# Patient Record
Sex: MALE | Race: WHITE | Employment: FULL TIME | URBAN - METROPOLITAN AREA
[De-identification: names, ages, dates, MRNs, and addresses within clinical notes are randomized per-mention and may not be internally consistent; named-entity substitution may affect disease eponyms.]

---

## 2017-05-13 ENCOUNTER — EMPLOYEE WELLNESS (OUTPATIENT)
Dept: OTHER | Age: 42
End: 2017-05-13

## 2017-05-13 LAB
CHOLESTEROL, TOTAL: 140 MG/DL (ref 0–199)
GLUCOSE BLD-MCNC: 101 MG/DL (ref 70–99)
HDLC SERPL-MCNC: 58 MG/DL (ref 40–60)
LDL CHOLESTEROL CALCULATED: 74 MG/DL
TRIGL SERPL-MCNC: 39 MG/DL (ref 0–150)

## 2017-12-27 ENCOUNTER — OFFICE VISIT (OUTPATIENT)
Dept: ORTHOPEDIC SURGERY | Age: 42
End: 2017-12-27

## 2017-12-27 VITALS
WEIGHT: 145.06 LBS | DIASTOLIC BLOOD PRESSURE: 76 MMHG | HEIGHT: 72 IN | SYSTOLIC BLOOD PRESSURE: 111 MMHG | HEART RATE: 71 BPM | BODY MASS INDEX: 19.65 KG/M2

## 2017-12-27 DIAGNOSIS — M54.50 LUMBAR SPINE PAIN: Primary | ICD-10-CM

## 2017-12-27 DIAGNOSIS — S39.012A STRAIN OF LUMBAR REGION, INITIAL ENCOUNTER: ICD-10-CM

## 2017-12-27 PROCEDURE — 72100 X-RAY EXAM L-S SPINE 2/3 VWS: CPT | Performed by: PHYSICIAN ASSISTANT

## 2017-12-27 PROCEDURE — 99202 OFFICE O/P NEW SF 15 MIN: CPT | Performed by: PHYSICIAN ASSISTANT

## 2017-12-27 RX ORDER — CYCLOBENZAPRINE HCL 10 MG
10 TABLET ORAL 3 TIMES DAILY PRN
Qty: 20 TABLET | Refills: 0 | Status: SHIPPED | OUTPATIENT
Start: 2017-12-27 | End: 2018-01-06

## 2017-12-27 RX ORDER — METHYLPREDNISOLONE 4 MG/1
TABLET ORAL
Qty: 1 KIT | Refills: 0 | Status: SHIPPED | OUTPATIENT
Start: 2017-12-27 | End: 2018-01-02

## 2017-12-27 NOTE — PROGRESS NOTES
low back    Plan: rest the injured area as much as practical, apply ice packs, prescription for muscle relaxant, referral to Physical Therapy  We discussed and reviewed his x rays today. We discussed his low back strain as well as his previous back injuries  We discussed recommendation at this time for medrol dose pack and flexeril 10mg TID PRN #20. We discussed recommendation for PT to initiate lumbar stabilization and core exercises. Pt is in agreement and was given a PT script today  He will continue to avoid painful exercises  He will follow up with Dr. Sanjay Weiss. Pt also noted left knee pain for some time after a tough mudder, we have referred him to Dr. Ja Hadley or Ekaterina Nugent for this issue    Mana Sewell PA-C        No orders of the defined types were placed in this encounter.

## 2017-12-28 NOTE — PATIENT INSTRUCTIONS
Follow up with Dr. Nicolas Bowers for your back  Follow up with Dr. Ana Smith or Romeo Francois for your knee

## 2018-03-02 ENCOUNTER — EMPLOYEE WELLNESS (OUTPATIENT)
Dept: OTHER | Age: 43
End: 2018-03-02

## 2018-03-02 LAB
CHOLESTEROL, TOTAL: 135 MG/DL (ref 0–199)
GLUCOSE BLD-MCNC: 89 MG/DL (ref 70–99)
HDLC SERPL-MCNC: 61 MG/DL (ref 40–60)
LDL CHOLESTEROL CALCULATED: 67 MG/DL
TRIGL SERPL-MCNC: 33 MG/DL (ref 0–150)

## 2018-03-20 VITALS — WEIGHT: 151 LBS | BODY MASS INDEX: 20.48 KG/M2

## 2018-04-02 VITALS — BODY MASS INDEX: 20.61 KG/M2 | WEIGHT: 152 LBS

## 2018-12-27 ENCOUNTER — HOSPITAL ENCOUNTER (EMERGENCY)
Age: 43
Discharge: HOME | End: 2018-12-27
Attending: EMERGENCY MEDICINE
Payer: COMMERCIAL

## 2018-12-27 VITALS
WEIGHT: 145 LBS | RESPIRATION RATE: 20 BRPM | HEART RATE: 88 BPM | TEMPERATURE: 98.5 F | HEIGHT: 72 IN | OXYGEN SATURATION: 100 % | SYSTOLIC BLOOD PRESSURE: 122 MMHG | BODY MASS INDEX: 19.64 KG/M2 | DIASTOLIC BLOOD PRESSURE: 74 MMHG

## 2018-12-27 DIAGNOSIS — N20.0 KIDNEY STONE: Primary | ICD-10-CM

## 2018-12-27 LAB
A/G RATIO: 1.8 (ref 1.1–2.2)
ALBUMIN SERPL-MCNC: 4.7 G/DL (ref 3.4–5)
ALP BLD-CCNC: 34 U/L (ref 40–129)
ALT SERPL-CCNC: 10 U/L (ref 10–40)
ANION GAP SERPL CALCULATED.3IONS-SCNC: 10 MMOL/L (ref 3–16)
AST SERPL-CCNC: 19 U/L (ref 15–37)
BASOPHILS ABSOLUTE: 0 K/UL (ref 0–0.2)
BASOPHILS RELATIVE PERCENT: 0.7 %
BILIRUB SERPL-MCNC: 0.6 MG/DL (ref 0–1)
BILIRUBIN URINE: NEGATIVE
BLOOD, URINE: ABNORMAL
BUN BLDV-MCNC: 17 MG/DL (ref 7–20)
CALCIUM SERPL-MCNC: 9.7 MG/DL (ref 8.3–10.6)
CHLORIDE BLD-SCNC: 102 MMOL/L (ref 99–110)
CLARITY: CLEAR
CO2: 29 MMOL/L (ref 21–32)
COLOR: ABNORMAL
CREAT SERPL-MCNC: 0.9 MG/DL (ref 0.9–1.3)
EOSINOPHILS ABSOLUTE: 0 K/UL (ref 0–0.6)
EOSINOPHILS RELATIVE PERCENT: 0.6 %
GFR AFRICAN AMERICAN: >60
GFR NON-AFRICAN AMERICAN: >60
GLOBULIN: 2.6 G/DL
GLUCOSE BLD-MCNC: 105 MG/DL (ref 70–99)
GLUCOSE URINE: NEGATIVE MG/DL
HCT VFR BLD CALC: 43.3 % (ref 40.5–52.5)
HEMOGLOBIN: 14.7 G/DL (ref 13.5–17.5)
KETONES, URINE: ABNORMAL MG/DL
LEUKOCYTE ESTERASE, URINE: NEGATIVE
LYMPHOCYTES ABSOLUTE: 1.2 K/UL (ref 1–5.1)
LYMPHOCYTES RELATIVE PERCENT: 22.6 %
MCH RBC QN AUTO: 29.9 PG (ref 26–34)
MCHC RBC AUTO-ENTMCNC: 33.9 G/DL (ref 31–36)
MCV RBC AUTO: 88.1 FL (ref 80–100)
MICROSCOPIC EXAMINATION: YES
MONOCYTES ABSOLUTE: 0.4 K/UL (ref 0–1.3)
MONOCYTES RELATIVE PERCENT: 8.3 %
MUCUS: ABNORMAL /LPF
NEUTROPHILS ABSOLUTE: 3.5 K/UL (ref 1.7–7.7)
NEUTROPHILS RELATIVE PERCENT: 67.8 %
NITRITE, URINE: NEGATIVE
OTHER OBSERVATIONS UA: ABNORMAL
PDW BLD-RTO: 13.2 % (ref 12.4–15.4)
PH UA: 6
PLATELET # BLD: 184 K/UL (ref 135–450)
PMV BLD AUTO: 8 FL (ref 5–10.5)
POTASSIUM REFLEX MAGNESIUM: 4 MMOL/L (ref 3.5–5.1)
PROTEIN UA: ABNORMAL MG/DL
RBC # BLD: 4.91 M/UL (ref 4.2–5.9)
RBC UA: ABNORMAL /HPF (ref 0–2)
SODIUM BLD-SCNC: 141 MMOL/L (ref 136–145)
SPECIFIC GRAVITY UA: 1.02
TOTAL PROTEIN: 7.3 G/DL (ref 6.4–8.2)
URINE REFLEX TO CULTURE: ABNORMAL
URINE TYPE: ABNORMAL
UROBILINOGEN, URINE: 0.2 E.U./DL
WBC # BLD: 5.1 K/UL (ref 4–11)
WBC UA: ABNORMAL /HPF (ref 0–5)

## 2018-12-27 PROCEDURE — 80053 COMPREHEN METABOLIC PANEL: CPT

## 2018-12-27 PROCEDURE — 99284 EMERGENCY DEPT VISIT MOD MDM: CPT

## 2018-12-27 PROCEDURE — 81001 URINALYSIS AUTO W/SCOPE: CPT

## 2018-12-27 PROCEDURE — 85025 COMPLETE CBC W/AUTO DIFF WBC: CPT

## 2018-12-27 PROCEDURE — 36415 COLL VENOUS BLD VENIPUNCTURE: CPT

## 2018-12-27 ASSESSMENT — PAIN DESCRIPTION - FREQUENCY: FREQUENCY: INTERMITTENT

## 2018-12-27 ASSESSMENT — ENCOUNTER SYMPTOMS
STRIDOR: 0
COLOR CHANGE: 0
NAUSEA: 0
BLOOD IN STOOL: 0
TROUBLE SWALLOWING: 0
FACIAL SWELLING: 0
BACK PAIN: 0
VOMITING: 0
WHEEZING: 0
VOICE CHANGE: 0
SHORTNESS OF BREATH: 0
PHOTOPHOBIA: 0
ABDOMINAL PAIN: 1

## 2018-12-27 ASSESSMENT — PAIN DESCRIPTION - LOCATION: LOCATION: FLANK

## 2018-12-27 ASSESSMENT — PAIN DESCRIPTION - ORIENTATION: ORIENTATION: RIGHT

## 2018-12-27 ASSESSMENT — PAIN SCALES - GENERAL: PAINLEVEL_OUTOF10: 4

## 2018-12-27 ASSESSMENT — PAIN DESCRIPTION - DESCRIPTORS: DESCRIPTORS: SHOOTING;SHARP

## 2018-12-27 NOTE — ED NOTES
Pt states \"I think I have another kidney stone. \" States sudden onset of stabbing / shooting R flank pain with nausea x approx 1 hr PTA. Pt states \"The pain got a lot better after I got here and peed for a urine specimen. \" Denies V/D, hematuria or further c/o's.       Tri Walker RN  12/27/18 2467

## 2020-10-27 ENCOUNTER — OFFICE VISIT (OUTPATIENT)
Dept: ORTHOPEDIC SURGERY | Age: 45
End: 2020-10-27
Payer: COMMERCIAL

## 2020-10-27 VITALS — RESPIRATION RATE: 12 BRPM | WEIGHT: 145.06 LBS | BODY MASS INDEX: 19.65 KG/M2 | TEMPERATURE: 97.8 F | HEIGHT: 72 IN

## 2020-10-27 PROCEDURE — 99203 OFFICE O/P NEW LOW 30 MIN: CPT | Performed by: PHYSICAL MEDICINE & REHABILITATION

## 2020-10-27 RX ORDER — METHYLPREDNISOLONE 4 MG/1
TABLET ORAL
Qty: 1 KIT | Refills: 0 | Status: SHIPPED | OUTPATIENT
Start: 2020-10-27 | End: 2020-11-02

## 2020-10-27 NOTE — PROGRESS NOTES
New Patient: SPINE    Referring Provider:  No ref. provider found    Chief Complaint   Patient presents with    Lower Back Pain     NP LSP - pn since 10/23/2020. squatting moving a fence. felt a pinch. c/o constant pain since. denies radicular symptoms. HISTORY OF PRESENT ILLNESS:      · The patient is being sent at the request of No ref. provider found in consultation as a new spine patient for low back pain and left leg pain The patient is a 39 y.o. male whom reports low back and left leg pain for 5 days. The patient describes a history of back pain triggered with activity such as lifting and pulling. The patient states 5 days ago he was lifting fencing material he felt a pinch in his low back. The pain has been worsening since the pain began. The patient rates his pain 10/10 at its worst and 6/10 today in the office. He describes pain as sharp, pinching and stabbing. Aggravating factors include movement such as twisting, reaching, and bending. Alleviating factors include lying on a firm surface. The patient has not tried any treatment so far. He states his wife offered a muscle relaxant and he declined. His coworker advised him to try a heat back and hamstring stretches but he has not tried these yet.      Pain Assessment  Location of Pain: Back(LSP)  Severity of Pain: 6  Quality of Pain: Sharp(STABBING)  Duration of Pain: Persistent  Frequency of Pain: Constant  Date Pain First Started: (10/23/2020)  Aggravating Factors: Squatting(LIFTING)  Limiting Behavior: Yes  Result of Injury: No(INJURED WHILE PUTTING UP A FENCE)  Work-Related Injury: No  Are there other pain locations you wish to document?: No      Associated signs and symptoms:   Neurogenic bowel or bladder symptoms:  no   Perceived weakness:  no   Difficulty walking:  no    Recent Imaging (within past one year)   Xrays: no   MRI or CT of spine: no    Current/Past Treatment:   · Physical Therapy:  none  · Chiropractic:  none  · Injection: none  · Medications:   NSAIDS:  none   Muscle relaxer:  none   Steriods:  none   Neuropathic medications:  none   Opioids:  none  · Previous surgery:  no  · Previous surgical consult:  no  · Other:  · Infection control  · Tested positive for MRSA in past 12 months:  no  · Tested positive for MSSA \"staph infection\" in past 12 months: no  · Tested positive for VRE (Vancomycin Resistant Enterococci) in past 12 months:   no  · Currently on any antibiotics for an infection: no  · Anticoagulants:  · On a blood thinner:  no   · Any history of bleeding disorder: no   · MRI Contraindication: no   · Previous Pain Management: no   · Goal for treatment Pain reduction  · How long can you stand?  hour    Sit?  45 min       Walk? - hour      Past medical, surgical, social and family history reviewed with the patient. No pertinent relevant history            Past Medical History:   Past Medical History:   Diagnosis Date    Kidney calculi       Past Surgical History:     Past Surgical History:   Procedure Laterality Date    OTHER SURGICAL HISTORY  11/27/2013    cystoscopy, left retrograde, left ureteroscopy, stone extraction     Current Medications:     Current Outpatient Medications:     methylPREDNISolone (MEDROL, MICHAEL,) 4 MG tablet, Take by mouth., Disp: 1 kit, Rfl: 0  Allergies:  Dilaudid [hydromorphone]  Social History:    reports that he has never smoked. He has never used smokeless tobacco. He reports current alcohol use. He reports that he does not use drugs. Family History:   Family History   Family history unknown: Yes       REVIEW OF SYSTEMS: ROS - 14 point    Constitutional: No fevers, chills, night sweats, unexplained weight loss  Eye: No vision changes or diplopia  ENT: No nasal congestion, postnasal drip or sore throat.  No tinnitus  Respiratory: No cough or SOB  CV: No chest pain or palpitations  GI: No nausea, abdominal pain, stool changes  : No dysuria or hematuria  Skin: No new or changing skin lesions, no rashes  MSK: No joint swelling, morning stiffness, unusual joint pain  Neurological: No headache, confusion, syncope  Psychiatric: No excessive anxiety or depression  Endocrine: No polyuria or polydipsia  Hematologic: No lymph node enlargement or excessive bleeding  Immunologic:No history of immune deficiency or immunomodulating drugs           PHYSICAL EXAM:    Vitals: Temperature 97.8 °F (36.6 °C), resp. rate 12, height 6' 0.01\" (1.829 m), weight 145 lb 1 oz (65.8 kg). GENERAL EXAM:  · General Apparence: Patient is adequately groomed with no evidence of malnutrition. · Psychiatric: Orientation: The patient is oriented to time, place and person. The patient's mood and affect are appropriate   · Vascular: Examination reveals no swelling and palpation reveals no tenderness in upper or lower extremities. Good capillary refill. · The lymphatic examination of the neck, axillae and groin reveals all areas to be without enlargement or induration   Sensation is intact without deficit in the upper and lower extremities to light touch and pinprick  · Coordination of the upper and lower extremities are normal.    CERVICAL EXAMINATION:  · Inspection: Local inspection shows no step-off or bruising. Cervical alignment is normal. No instability is noted. · Palpation and Percussion: No evidence of tenderness at the midline. Paraspinal tenderness is not present. There is no paraspinal spasm. · Range of Motion:  pain-free ROM   · Strength: 5/5 bilateral upper extremities  · Special Tests:   Spurling's and Mcmahan's are negative bilaterally. Galeana and Impingement tests are negative bilaterally. · Skin:There are no rashes, ulcerations or lesions. · Reflexes: Bilaterally triceps, biceps and brachioradialis are 1+. Clonus absent bilaterally at the feet. No pathological reflexes are noted.   · Gait & station:  antalgic on the right and no ataxia  · Additional Examinations:  · RIGHT UPPER EXTREMITY: Inspection/examination of the right upper extremity does not show any tenderness, deformity or injury. Range of motion is normal and pain-free. There is no gross instability. There are no rashes, ulcerations or lesions. Strength and tone are normal. No atrophy or abnormal movements are noted. · LEFT UPPER EXTREMITY: Inspection/examination of the left upper extremity does not show any tenderness, deformity or injury. Range of motion is normal and pain-free. There is no gross instability. There are no rashes, ulcerations or lesions. Strength and tone are normal. No atrophy or abnormal movements are noted. LUMBAR/SACRAL EXAMINATION:  · Inspection: Local inspection shows no step-off or bruising. Lumbar alignment is normal. No instability is noted. · Palpation:   No evidence of tenderness at the midline. Lumbar paraspinal tenderness Mild L4/5 and L5/S1 tenderness  Bursal tenderness No tenderness bilaterally  There is no paraspinal spasm. · Range of Motion: limited by 50% in all planes due to pain  · Strength:   Strength testing is 5/5 in all muscle groups tested. · Special Tests:   Straight leg raise and crossed SLR negative. Baljit's testing is negative bilaterally. FADIR's testing is negative bilaterally. Slump test negative. Bowstring test negative  · Skin: There are no rashes, ulcerations or lesions. · Reflexes: Reflexes are symmetrically 1+ at the patellar and ankle tendons. Clonus absent bilaterally at the feet. · Gait & station: antalgic on the right and no ataxia  · Additional Examinations:  · RIGHT LOWER EXTREMITY: Inspection/examination of the right lower extremity does not show any tenderness, deformity or injury. Range of motion is unremarkable. There is no gross instability. There are no rashes, ulcerations or lesions. Strength and tone are normal. No atrophy or abnormal movements are noted.   · LEFT LOWER EXTREMITY:  Inspection/examination of the left lower extremity does not show any tenderness, deformity or injury. Range of motion is unremarkable. There is no gross instability. There are no rashes, ulcerations or lesions. Strength and tone are normal. No atrophy or abnormal movements are noted. Diagnostic Testing:      No results found.    Xrays:   AP and lateral of the lumbar spine taken today in the office show 5 lumbar type vertebrae, mild degenerative disc disease L3-L4, L4-L5, L5-S1, L3 4 retrolisthesis  MRI or CT:  None  EMG:  None  Results for orders placed or performed during the hospital encounter of 12/27/18   Comprehensive Metabolic Panel w/ Reflex to MG   Result Value Ref Range    Sodium 141 136 - 145 mmol/L    Potassium reflex Magnesium 4.0 3.5 - 5.1 mmol/L    Chloride 102 99 - 110 mmol/L    CO2 29 21 - 32 mmol/L    Anion Gap 10 3 - 16    Glucose 105 (H) 70 - 99 mg/dL    BUN 17 7 - 20 mg/dL    CREATININE 0.9 0.9 - 1.3 mg/dL    GFR Non-African American >60 >60    GFR African American >60 >60    Calcium 9.7 8.3 - 10.6 mg/dL    Total Protein 7.3 6.4 - 8.2 g/dL    Alb 4.7 3.4 - 5.0 g/dL    Albumin/Globulin Ratio 1.8 1.1 - 2.2    Total Bilirubin 0.6 0.0 - 1.0 mg/dL    Alkaline Phosphatase 34 (L) 40 - 129 U/L    ALT 10 10 - 40 U/L    AST 19 15 - 37 U/L    Globulin 2.6 g/dL   CBC auto differential   Result Value Ref Range    WBC 5.1 4.0 - 11.0 K/uL    RBC 4.91 4.20 - 5.90 M/uL    Hemoglobin 14.7 13.5 - 17.5 g/dL    Hematocrit 43.3 40.5 - 52.5 %    MCV 88.1 80.0 - 100.0 fL    MCH 29.9 26.0 - 34.0 pg    MCHC 33.9 31.0 - 36.0 g/dL    RDW 13.2 12.4 - 15.4 %    Platelets 783 635 - 192 K/uL    MPV 8.0 5.0 - 10.5 fL    Neutrophils % 67.8 %    Lymphocytes % 22.6 %    Monocytes % 8.3 %    Eosinophils % 0.6 %    Basophils % 0.7 %    Neutrophils Absolute 3.5 1.7 - 7.7 K/uL    Lymphocytes Absolute 1.2 1.0 - 5.1 K/uL    Monocytes Absolute 0.4 0.0 - 1.3 K/uL    Eosinophils Absolute 0.0 0.0 - 0.6 K/uL    Basophils Absolute 0.0 0.0 - 0.2 K/uL   Urinalysis Reflex to Culture    Specimen: Urine, clean

## 2021-01-09 ENCOUNTER — APPOINTMENT (OUTPATIENT)
Dept: GENERAL RADIOLOGY | Age: 46
End: 2021-01-09
Payer: COMMERCIAL

## 2021-01-09 ENCOUNTER — HOSPITAL ENCOUNTER (EMERGENCY)
Age: 46
Discharge: HOME | End: 2021-01-10
Attending: EMERGENCY MEDICINE
Payer: COMMERCIAL

## 2021-01-09 VITALS
RESPIRATION RATE: 18 BRPM | HEIGHT: 72 IN | BODY MASS INDEX: 20.32 KG/M2 | HEART RATE: 52 BPM | SYSTOLIC BLOOD PRESSURE: 139 MMHG | WEIGHT: 150 LBS | DIASTOLIC BLOOD PRESSURE: 85 MMHG | TEMPERATURE: 98.4 F | OXYGEN SATURATION: 99 %

## 2021-01-09 DIAGNOSIS — M79.89 SWELLING OF RIGHT HAND: ICD-10-CM

## 2021-01-09 DIAGNOSIS — S69.91XA INJURY OF RIGHT HAND, INITIAL ENCOUNTER: Primary | ICD-10-CM

## 2021-01-09 PROCEDURE — 6370000000 HC RX 637 (ALT 250 FOR IP)

## 2021-01-09 PROCEDURE — 99284 EMERGENCY DEPT VISIT MOD MDM: CPT

## 2021-01-09 PROCEDURE — 73130 X-RAY EXAM OF HAND: CPT

## 2021-01-09 PROCEDURE — 29125 APPL SHORT ARM SPLINT STATIC: CPT

## 2021-01-09 RX ORDER — OXYCODONE HYDROCHLORIDE AND ACETAMINOPHEN 5; 325 MG/1; MG/1
TABLET ORAL
Status: COMPLETED
Start: 2021-01-09 | End: 2021-01-09

## 2021-01-09 RX ADMIN — OXYCODONE HYDROCHLORIDE AND ACETAMINOPHEN 1 TABLET: 5; 325 TABLET ORAL at 23:29

## 2021-01-09 ASSESSMENT — PAIN SCALES - GENERAL
PAINLEVEL_OUTOF10: 9
PAINLEVEL_OUTOF10: 9

## 2021-01-09 ASSESSMENT — PAIN DESCRIPTION - PAIN TYPE: TYPE: ACUTE PAIN

## 2021-01-10 RX ORDER — OXYCODONE HYDROCHLORIDE AND ACETAMINOPHEN 5; 325 MG/1; MG/1
1 TABLET ORAL EVERY 8 HOURS PRN
Qty: 6 TABLET | Refills: 0 | Status: SHIPPED | OUTPATIENT
Start: 2021-01-10 | End: 2021-01-12

## 2021-01-10 NOTE — ED NOTES
Dr. Sena Verduzco at bedside at this time going over test results     Venus Amaya, RN  01/10/21 0009

## 2021-01-10 NOTE — ED NOTES
Dr. Alyssa Montague gave verbal order for percocet 5/325 to give to pt. For pain rating 9/10.      Mani Light RN  01/09/21 9509

## 2021-01-10 NOTE — ED PROVIDER NOTES
[address]      Pt Name: Aleksander Jones  MRN: [id number]  [place] 1975  Date of evaluation: 1/9/2021  Mark Rome MD    [address] [place] [address]       Chief Complaint   Patient presents with    Hand Injury     Pt. states he punched wall this morning around 0900. States  looked at it this morning and stated it was broken. States pain just keeps getting worse. HISTORY OF PRESENT ILLNESS   (Location/Symptom, Timing/Onset,Context/Setting, Quality, Duration, Modifying Factors, Severity)  Note limiting factors. Aleksander Jones is a 39 y.o. male who presents to the emergency department for head injury and pain. Patient states that he was upset and punched a wall this morning and has been able to go on with his day with his usual activities, at that time pain was thought to 3/10 however after several hours, started to progressively get worse. States that he is having tingling to his hand and swelling. Applied ice on it, has not taken any over-the-counter medications for pain management. States that he knows that it is broken. HPI    Nursing Notes were reviewed. REVIEW OF SYSTEMS    (2-9 systems for level 4, 10 or more for level 5)     Review of Systems   Musculoskeletal: Positive for arthralgias and joint swelling. Except as noted above the remainder of the review of systems was reviewedand negative.        PAST MEDICAL HISTORY     Past Medical History:   Diagnosis Date    Kidney calculi          SURGICAL HISTORY       Past Surgical History:   Procedure Laterality Date    OTHER SURGICAL HISTORY  11/27/2013    cystoscopy, left retrograde, left ureteroscopy, stone extraction         CURRENT MEDICATIONS       Previous Medications    No medications on file       ALLERGIES     Dilaudid [hydromorphone]    FAMILY HISTORY       Family History   Family history unknown: Yes          SOCIAL HISTORY       Social History     Socioeconomic History    Marital status:      Spouse name: None    Number of children: None    Years of education: None    Highest education level: None   Occupational History    None   Social Needs    Financial resource strain: None    Food insecurity     Worry: None     Inability: None    Transportation needs     Medical: None     Non-medical: None   Tobacco Use    Smoking status: Never Smoker    Smokeless tobacco: Never Used   Substance and Sexual Activity    Alcohol use: Yes     Comment: occ    Drug use: No    Sexual activity: Yes     Partners: Female   Lifestyle    Physical activity     Days per week: None     Minutes per session: None    Stress: None   Relationships    Social connections     Talks on phone: None     Gets together: None     Attends Religion service: None     Active member of club or organization: None     Attends meetings of clubs or organizations: None     Relationship status: None    Intimate partner violence     Fear of current or ex partner: None     Emotionally abused: None     Physically abused: None     Forced sexual activity: None   Other Topics Concern    None   Social History Narrative    None       SCREENINGS    [place] Coma Scale  Eye Opening: Spontaneous  Best Verbal Response: Oriented  Best Motor Response: Obeys commands  [place] Coma Scale Score: 15        PHYSICAL EXAM    (up to 7 for level 4, 8 ormore for level 5)     ED Triage Vitals [01/09/21 2309]   BP Temp Temp Source Pulse Resp SpO2 Height Weight   139/85 98.4 °F (36.9 °C) Oral 52 18 99 % 6' (1.829 m) 150 lb (68 kg)       Physical Exam  Vitals signs and nursing note reviewed. Constitutional:       General: He is not in acute distress. Appearance: He is well-developed. He is not ill-appearing, toxic-appearing or diaphoretic. Comments: Well-appearing, nontoxic, not in acute distress. Answers questions in full sentences and following verbal commands appropriately   HENT:      Head: Normocephalic and atraumatic. Eyes:      General:         Right eye: No discharge. Left eye: No discharge. Conjunctiva/sclera: Conjunctivae normal.   Neck:      Musculoskeletal: Normal range of motion and neck supple. Pulmonary:      Effort: Pulmonary effort is normal. No respiratory distress. Musculoskeletal:      Right hand: He exhibits decreased range of motion, tenderness, deformity and swelling. He exhibits normal capillary refill. Hands:       Comments: Able to wiggle right fingers, brisk cap refill, 2+ radial pulse intact. Sensation grossly intact. Difficulty making a fist or full flexion of fingers. No open wounds   Skin:     Coloration: Skin is not pale. Neurological:      Mental Status: He is alert and oriented to person, place, and time. Psychiatric:         Behavior: Behavior normal. Behavior is cooperative. DIAGNOSTIC RESULTS     EKG: All EKG's are interpreted by the Emergency Department Physicianwho either signs or Co-signs this chart in the absence of a cardiologist.      RADIOLOGY:   Non-plain film images such as CT, Ultrasound and MRI are read by the radiologist. Plain radiographic images are visualized and preliminarily interpreted by the emergency physician with the below findings:      Interpretation per the Radiologist below, if available at the time of this note:    XR HAND RIGHT (MIN 3 VIEWS)   Final Result   No acute radiographic abnormality. ED BEDSIDE ULTRASOUND:   Performed by ED Physician - none    LABS:  Labs Reviewed - No data to display    All other labs were within normal range ornot returned as of this dictation.     EMERGENCY DEPARTMENT COURSE and DIFFERENTIAL DIAGNOSIS/MDM:   Vitals:    Vitals:    01/09/21 2309 01/09/21 2314   BP: 139/85 139/85   Pulse: 52 52   Resp: 18 18   Temp: 98.4 °F (36.9 °C) 98.4 °F (36.9 °C)   TempSrc: Oral Oral   SpO2: 99% 99%   Weight: 150 lb (68 kg)    Height: 6' (1.829 m)          MDM    ED COURSE/MDM    -Jeff Soto Melody is a 39 y.o. male presents to ED with injury to right hand with concern for fracture. States that he punched a wall earlier today. Examination as noted above. -X-ray of right hand: No acute radiographic abnormality  -Discussed results of the x-ray with patient as well as showing him the image. Formed and that there are no acute fracture or dislocation. Pain is likely from trauma as well as soft tissue swelling. It would likely take several days for it to improve. Patient however still indicates that he is in significant pain with any movement. We then came to a decision making to apply an ulnar gutter splint for comfort purposes and immobilization to allow swelling to subside. We will also give 1 to 2 days of pain medication in the meantime.    -If he continues to have significant pain despite improvement of swelling, would recommend reevaluation and possible reimaging. Strict ED return precautions given for new/worsending symptoms. Patient in agreement withplan, verbally confirm understanding and have no further questions/concerns. REASSESSMENT      Well appearing, non toxic, alert, oriented speaking in full sentences and hemodynamically stable upon discharge      CRITICAL CARE TIME   Total Critical Care time was 0 minutes, excluding separately reportableprocedures. There was a high probability of clinicallysignificant/life threatening deterioration in the patient's condition which required my urgent intervention. CONSULTS:  None    PROCEDURES:  Unless otherwise noted below, none     Procedures    FINAL IMPRESSION      1. Injury of right hand, initial encounter    2. Swelling of right hand          DISPOSITION/PLAN   DISPOSITION Decision To Discharge 01/10/2021 12:23:50 AM      PATIENT REFERREDTO:  No follow-up provider specified.     DISCHARGE MEDICATIONS:  New Prescriptions    OXYCODONE-ACETAMINOPHEN (PERCOCET) 5-325 MG PER TABLET    Take 1 tablet by mouth every 8 hours as needed for Pain for up to 2

## 2023-08-25 ENCOUNTER — PROCEDURE VISIT (OUTPATIENT)
Dept: SPORTS MEDICINE | Age: 48
End: 2023-08-25

## 2023-08-25 DIAGNOSIS — S89.92XA INJURY OF LEFT KNEE, INITIAL ENCOUNTER: Primary | ICD-10-CM

## 2023-08-26 NOTE — PROGRESS NOTES
Athletic Training  Date of Report: 23  Name: Nicolle Johnston  School: [employer]  Sport:  Parent/Teacher  : 1975  Age: 57 y.o. MRN: [id number]  Encounter:  [x] New AT Eval     [] Follow-Up Visit    [] Other: SUBJECTIVE:  Reason for Visit:    Chief Complaint   Patient presents with    Knee Injury     Left Knee Injury     Nicolle Johnston is a 57y.o. year old, male who presents today for evaluation of personal injury involving left knee. 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 Onset of the injury began yesterday and injury occurred during training. Current pain and symptoms include: sharp, stabbing, and throbbing. Current level of pain is a 8. Symptoms have been constant since that time. Symptoms improve with rest. Symptoms worsen with weight bearing. The knee has given out or felt unstable. Associated sounds or feelings at time of injury included: pop. Treatment to date has included: none. Treatment has been N/A. Previous history includes: None. Pt was sprinting forward when he did an immediate stop and turn, followed by immediate pain and popping in his knee. OBJECTIVE:   Physical Exam  Vital Signs:   [x] There were no vitals taken for this visit  Date/Time Taken         Blood Pressure         Pulse          Constitution:   Appearance: Kelvin Oliver is [x] alert, [x] appears stated age, and [x] in no distress. Benitez Oliver general body habitus is:    [] Cachectic [] Thin [x] Normal [] Obese [] Morbidly Obese  Pulmonary: Rate   [] Fast [x] Normal [] Slow    Rhythm  [x] Regular [] Irregular   Volume [x] Adequate  [] Shallow [] Deep  Effort  [] Labored [x] Unlabored  Skin:  Color  [x] Normal [] Pale [] Cyanotic    Temperature [] Hot   [x] Warm [] Cool  [] Cold     Moisture [] Dry  [x] Moist [] Warm    Psychiatric:   [x] Good judgement and insight. [x] Oriented to [x] person, [x] place, and [x] time.   [x] Mood appropriate for

## 2023-08-29 ENCOUNTER — OFFICE VISIT (OUTPATIENT)
Dept: ORTHOPEDIC SURGERY | Age: 48
End: 2023-08-29
Payer: COMMERCIAL

## 2023-08-29 DIAGNOSIS — M25.562 LEFT KNEE PAIN, UNSPECIFIED CHRONICITY: Primary | ICD-10-CM

## 2023-08-29 PROCEDURE — 99203 OFFICE O/P NEW LOW 30 MIN: CPT | Performed by: STUDENT IN AN ORGANIZED HEALTH CARE EDUCATION/TRAINING PROGRAM

## 2023-08-29 NOTE — PROGRESS NOTES
and surgical interventions were all described to the patient and questions were answered. The patient has limited range of motion and locking of the knee after acute injury 4 days ago, I am concerned for a large medial meniscus tear. I will get the patient set up for an MRI. I recommend he stick to walking for now to prevent any potential further injury to his cartilage. Follow-up after the MRI to discuss results and treatment options. Jessica Oliver is in agreement with this plan. All questions were answered to patient's satisfaction and was encouraged to call with any further questions.          Mary Palomares, DO  Orthopedic Surgery and Sports Medicine  8/29/2023    Orders Placed This Encounter   Procedures    XR KNEE LEFT (MIN 4 VIEWS)     Standing Status:   Future     Number of Occurrences:   1     Standing Expiration Date:   8/29/2024     Order Specific Question:   Reason for exam:     Answer:   pain    MRI KNEE LEFT WO CONTRAST     Standing Status:   Future     Standing Expiration Date:   8/29/2024     Scheduling Instructions:      R/O MEDIAL MENISCUS TEAR      Alexandra Olivas       [facility] [address]      [phone]      FAX: [phone]            **PUSH IMAGES TO [facility] PACS**                  PENDING APPROVAL FROM PRE-CERT DEPARTMENT             TIME AND DATE PATIENT AVAILABILITY:     Order Specific Question:   Reason for exam:     Answer:   R/O MEDIAL MENISCUS TEAR